# Patient Record
Sex: FEMALE | Employment: FULL TIME | URBAN - METROPOLITAN AREA
[De-identification: names, ages, dates, MRNs, and addresses within clinical notes are randomized per-mention and may not be internally consistent; named-entity substitution may affect disease eponyms.]

---

## 2020-06-04 ENCOUNTER — HOSPITAL ENCOUNTER (EMERGENCY)
Age: 20
Discharge: HOME | End: 2020-06-04
Attending: EMERGENCY MEDICINE

## 2020-06-04 ENCOUNTER — APPOINTMENT (OUTPATIENT)
Dept: GENERAL RADIOLOGY | Age: 20
End: 2020-06-04
Attending: EMERGENCY MEDICINE

## 2020-06-04 VITALS
RESPIRATION RATE: 17 BRPM | TEMPERATURE: 98 F | SYSTOLIC BLOOD PRESSURE: 131 MMHG | DIASTOLIC BLOOD PRESSURE: 78 MMHG | WEIGHT: 100 LBS | HEART RATE: 79 BPM | BODY MASS INDEX: 20.99 KG/M2 | HEIGHT: 58 IN | OXYGEN SATURATION: 99 %

## 2020-06-04 DIAGNOSIS — S93.401A SPRAIN OF RIGHT ANKLE, UNSPECIFIED LIGAMENT, INITIAL ENCOUNTER: Primary | ICD-10-CM

## 2020-06-04 PROCEDURE — 99283 EMERGENCY DEPT VISIT LOW MDM: CPT

## 2020-06-04 PROCEDURE — 73630 X-RAY EXAM OF FOOT: CPT | Performed by: EMERGENCY MEDICINE

## 2020-06-04 PROCEDURE — 73610 X-RAY EXAM OF ANKLE: CPT | Performed by: EMERGENCY MEDICINE

## 2020-06-04 RX ORDER — PANTOPRAZOLE SODIUM 20 MG/1
20 TABLET, DELAYED RELEASE ORAL
COMMUNITY

## 2020-06-04 NOTE — ED PROVIDER NOTES
Patient Seen in: THE [facility] Emergency Department In [place]      History   Patient presents with:  Lower Extremity Injury    Stated Complaint: right foot/ankle injury on Monday.     HPI    Patient complains of right foot and ankle pain after an injury on M display         X-ray ankle  CONCLUSION:  Negative for acute fracture.     X-ray foot  CONCLUSION:  Negative for acute fracture.       MDM     Patient has a sprain of her foot and ankle  I recommend:    Rest  Elevate your injured extremity  Apply cool compr

## (undated) NOTE — LETTER
June 4, 2020    Patient: Ronnette Boast   Date of Visit: 6/4/2020       To Whom It May Concern:    Ronnette Boast was seen and treated in our emergency department on 6/4/2020. She should not return to work until seen by her Emmett Badillo.     If you saleh